# Patient Record
Sex: MALE | NOT HISPANIC OR LATINO
[De-identification: names, ages, dates, MRNs, and addresses within clinical notes are randomized per-mention and may not be internally consistent; named-entity substitution may affect disease eponyms.]

---

## 2018-12-10 ENCOUNTER — TRANSCRIPTION ENCOUNTER (OUTPATIENT)
Age: 33
End: 2018-12-10

## 2022-02-17 ENCOUNTER — TRANSCRIPTION ENCOUNTER (OUTPATIENT)
Age: 37
End: 2022-02-17

## 2023-01-21 ENCOUNTER — INPATIENT (INPATIENT)
Facility: HOSPITAL | Age: 38
LOS: 1 days | Discharge: HOME | DRG: 552 | End: 2023-01-23
Attending: SURGERY | Admitting: SURGERY
Payer: COMMERCIAL

## 2023-01-21 VITALS
HEART RATE: 85 BPM | OXYGEN SATURATION: 100 % | RESPIRATION RATE: 18 BRPM | SYSTOLIC BLOOD PRESSURE: 164 MMHG | TEMPERATURE: 98 F | DIASTOLIC BLOOD PRESSURE: 99 MMHG

## 2023-01-21 LAB
APTT BLD: 28.5 SEC — SIGNIFICANT CHANGE UP (ref 27.5–35.5)
BASOPHILS # BLD AUTO: 0.06 K/UL — SIGNIFICANT CHANGE UP (ref 0–0.2)
BASOPHILS NFR BLD AUTO: 0.8 % — SIGNIFICANT CHANGE UP (ref 0–2)
EOSINOPHIL # BLD AUTO: 0.2 K/UL — SIGNIFICANT CHANGE UP (ref 0–0.5)
EOSINOPHIL NFR BLD AUTO: 2.5 % — SIGNIFICANT CHANGE UP (ref 0–6)
ETHANOL SERPL-MCNC: 209 MG/DL — HIGH (ref 0–10)
HCT VFR BLD CALC: 41.9 % — SIGNIFICANT CHANGE UP (ref 39–50)
HGB BLD-MCNC: 13.2 G/DL — SIGNIFICANT CHANGE UP (ref 13–17)
IMM GRANULOCYTES NFR BLD AUTO: 0.8 % — SIGNIFICANT CHANGE UP (ref 0–0.9)
INR BLD: 1.11 RATIO — SIGNIFICANT CHANGE UP (ref 0.88–1.16)
LACTATE SERPL-SCNC: 4.1 MMOL/L — CRITICAL HIGH (ref 0.5–2)
LYMPHOCYTES # BLD AUTO: 2.82 K/UL — SIGNIFICANT CHANGE UP (ref 1–3.3)
LYMPHOCYTES # BLD AUTO: 35.5 % — SIGNIFICANT CHANGE UP (ref 13–44)
MCHC RBC-ENTMCNC: 28.4 PG — SIGNIFICANT CHANGE UP (ref 27–34)
MCHC RBC-ENTMCNC: 31.5 GM/DL — LOW (ref 32–36)
MCV RBC AUTO: 90.3 FL — SIGNIFICANT CHANGE UP (ref 80–100)
MONOCYTES # BLD AUTO: 0.55 K/UL — SIGNIFICANT CHANGE UP (ref 0–0.9)
MONOCYTES NFR BLD AUTO: 6.9 % — SIGNIFICANT CHANGE UP (ref 2–14)
NEUTROPHILS # BLD AUTO: 4.26 K/UL — SIGNIFICANT CHANGE UP (ref 1.8–7.4)
NEUTROPHILS NFR BLD AUTO: 53.5 % — SIGNIFICANT CHANGE UP (ref 43–77)
NRBC # BLD: 0 /100 WBCS — SIGNIFICANT CHANGE UP (ref 0–0)
PLATELET # BLD AUTO: 270 K/UL — SIGNIFICANT CHANGE UP (ref 150–400)
PROTHROM AB SERPL-ACNC: 12.9 SEC — SIGNIFICANT CHANGE UP (ref 10.5–13.4)
RBC # BLD: 4.64 M/UL — SIGNIFICANT CHANGE UP (ref 4.2–5.8)
RBC # FLD: 12.3 % — SIGNIFICANT CHANGE UP (ref 10.3–14.5)
WBC # BLD: 7.95 K/UL — SIGNIFICANT CHANGE UP (ref 3.8–10.5)
WBC # FLD AUTO: 7.95 K/UL — SIGNIFICANT CHANGE UP (ref 3.8–10.5)

## 2023-01-21 PROCEDURE — 99285 EMERGENCY DEPT VISIT HI MDM: CPT | Mod: 25

## 2023-01-21 PROCEDURE — 72170 X-RAY EXAM OF PELVIS: CPT | Mod: 26

## 2023-01-21 PROCEDURE — 71045 X-RAY EXAM CHEST 1 VIEW: CPT | Mod: 26

## 2023-01-21 PROCEDURE — 12013 RPR F/E/E/N/L/M 2.6-5.0 CM: CPT

## 2023-01-21 RX ORDER — SODIUM CHLORIDE 9 MG/ML
250 INJECTION INTRAMUSCULAR; INTRAVENOUS; SUBCUTANEOUS ONCE
Refills: 0 | Status: COMPLETED | OUTPATIENT
Start: 2023-01-21 | End: 2023-01-21

## 2023-01-21 RX ORDER — SODIUM CHLORIDE 9 MG/ML
1000 INJECTION, SOLUTION INTRAVENOUS ONCE
Refills: 0 | Status: COMPLETED | OUTPATIENT
Start: 2023-01-21 | End: 2023-01-21

## 2023-01-21 RX ADMIN — SODIUM CHLORIDE 250 MILLILITER(S): 9 INJECTION INTRAMUSCULAR; INTRAVENOUS; SUBCUTANEOUS at 22:55

## 2023-01-21 RX ADMIN — SODIUM CHLORIDE 1000 MILLILITER(S): 9 INJECTION, SOLUTION INTRAVENOUS at 23:51
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
## 2023-01-21 NOTE — ED PROVIDER NOTE - PHYSICAL EXAMINATION
Primary Survey  A - airway intact  B - bilateral breath sounds and good chest rise  C - initially BP: 164/99, palpable pulses in all extremities  D - GCS 15 on arrival  Exposure obtained    Secondary survey  Gen: NAD  HEENT: 2 cm laceration of nose, C-spine no ttp, c-collar in place, pupils 3mm equal & reactive  CV: pulse regularly present  Pulm: CTA B/L  Chest: intact without ttp  Abd: Soft, ND, NT, no rebound, no guarding  Groin: Normal appearing  Ext: Palp radial b/l, palp DP b/l  Back: no TTP, no palpable runoff, stepoff, or deformity
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
## 2023-01-21 NOTE — ED ADULT NURSE REASSESSMENT NOTE - NS ED NURSE REASSESS COMMENT FT1
Pt states "I want to leave what are we waiting on". Plan of care was explained to pt, awaiting CT. Pt removed C-collar and states "I do not need this I just need a massage". The risks were explained to the pt about removal of cervical collar. Pt understanding and does not want to wear c-collar.

## 2023-01-21 NOTE — ED PROVIDER NOTE - INTERPRETATION
EKG reviewed for rate, rhythm, axis, intervals and segments, including QRS morphology, P wave appearance T wave appearance, OK interval, and QT interval.  I find the EKG to be unremarkable in all of these regards except as follows: 1st deg AVB
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
## 2023-01-22 DIAGNOSIS — S12.9XXA FRACTURE OF NECK, UNSPECIFIED, INITIAL ENCOUNTER: ICD-10-CM

## 2023-01-22 LAB
ALBUMIN SERPL ELPH-MCNC: 4.7 G/DL — SIGNIFICANT CHANGE UP (ref 3.3–5)
ALP SERPL-CCNC: 58 U/L — SIGNIFICANT CHANGE UP (ref 40–120)
ALT FLD-CCNC: 20 U/L — SIGNIFICANT CHANGE UP (ref 10–45)
ANION GAP SERPL CALC-SCNC: 25 MMOL/L — HIGH (ref 5–17)
AST SERPL-CCNC: 52 U/L — HIGH (ref 10–40)
BILIRUB SERPL-MCNC: 1.1 MG/DL — SIGNIFICANT CHANGE UP (ref 0.2–1.2)
BUN SERPL-MCNC: 14 MG/DL — SIGNIFICANT CHANGE UP (ref 7–23)
CALCIUM SERPL-MCNC: 9.6 MG/DL — SIGNIFICANT CHANGE UP (ref 8.4–10.5)
CHLORIDE SERPL-SCNC: 100 MMOL/L — SIGNIFICANT CHANGE UP (ref 96–108)
CO2 SERPL-SCNC: 14 MMOL/L — LOW (ref 22–31)
CREAT SERPL-MCNC: 1.13 MG/DL — SIGNIFICANT CHANGE UP (ref 0.5–1.3)
EGFR: 85 ML/MIN/1.73M2 — SIGNIFICANT CHANGE UP
GLUCOSE SERPL-MCNC: 116 MG/DL — HIGH (ref 70–99)
LACTATE SERPL-SCNC: 3.4 MMOL/L — HIGH (ref 0.5–2)
LIDOCAIN IGE QN: 18 U/L — SIGNIFICANT CHANGE UP (ref 7–60)
POTASSIUM SERPL-MCNC: 4.1 MMOL/L — SIGNIFICANT CHANGE UP (ref 3.5–5.3)
POTASSIUM SERPL-SCNC: 4.1 MMOL/L — SIGNIFICANT CHANGE UP (ref 3.5–5.3)
PROT SERPL-MCNC: 8.1 G/DL — SIGNIFICANT CHANGE UP (ref 6–8.3)
SARS-COV-2 RNA SPEC QL NAA+PROBE: SIGNIFICANT CHANGE UP
SODIUM SERPL-SCNC: 139 MMOL/L — SIGNIFICANT CHANGE UP (ref 135–145)

## 2023-01-22 PROCEDURE — 99222 1ST HOSP IP/OBS MODERATE 55: CPT

## 2023-01-22 PROCEDURE — 70496 CT ANGIOGRAPHY HEAD: CPT | Mod: 26,MA

## 2023-01-22 PROCEDURE — G1012: CPT

## 2023-01-22 PROCEDURE — 70498 CT ANGIOGRAPHY NECK: CPT | Mod: 26,MA

## 2023-01-22 PROCEDURE — 72141 MRI NECK SPINE W/O DYE: CPT | Mod: 26

## 2023-01-22 PROCEDURE — 72125 CT NECK SPINE W/O DYE: CPT | Mod: 26,MA

## 2023-01-22 PROCEDURE — 99253 IP/OBS CNSLTJ NEW/EST LOW 45: CPT

## 2023-01-22 PROCEDURE — 72128 CT CHEST SPINE W/O DYE: CPT | Mod: 26,ME

## 2023-01-22 PROCEDURE — 70450 CT HEAD/BRAIN W/O DYE: CPT | Mod: 26,59,MA

## 2023-01-22 PROCEDURE — 72131 CT LUMBAR SPINE W/O DYE: CPT | Mod: 26,ME

## 2023-01-22 PROCEDURE — 70486 CT MAXILLOFACIAL W/O DYE: CPT | Mod: 26,MA

## 2023-01-22 RX ORDER — INFLUENZA VIRUS VACCINE 15; 15; 15; 15 UG/.5ML; UG/.5ML; UG/.5ML; UG/.5ML
0.5 SUSPENSION INTRAMUSCULAR ONCE
Refills: 0 | Status: COMPLETED | OUTPATIENT
Start: 2023-01-22 | End: 2023-01-22

## 2023-01-22 RX ORDER — ACETAMINOPHEN 500 MG
975 TABLET ORAL EVERY 6 HOURS
Refills: 0 | Status: DISCONTINUED | OUTPATIENT
Start: 2023-01-22 | End: 2023-01-23

## 2023-01-22 RX ORDER — OXYCODONE HYDROCHLORIDE 5 MG/1
5 TABLET ORAL EVERY 4 HOURS
Refills: 0 | Status: DISCONTINUED | OUTPATIENT
Start: 2023-01-22 | End: 2023-01-23

## 2023-01-22 RX ORDER — SODIUM CHLORIDE 9 MG/ML
500 INJECTION, SOLUTION INTRAVENOUS ONCE
Refills: 0 | Status: COMPLETED | OUTPATIENT
Start: 2023-01-22 | End: 2023-01-22

## 2023-01-22 RX ORDER — ACETAMINOPHEN 500 MG
975 TABLET ORAL ONCE
Refills: 0 | Status: COMPLETED | OUTPATIENT
Start: 2023-01-22 | End: 2023-01-22

## 2023-01-22 RX ORDER — CYCLOBENZAPRINE HYDROCHLORIDE 10 MG/1
10 TABLET, FILM COATED ORAL THREE TIMES A DAY
Refills: 0 | Status: DISCONTINUED | OUTPATIENT
Start: 2023-01-22 | End: 2023-01-23

## 2023-01-22 RX ORDER — BACITRACIN ZINC 500 UNIT/G
1 OINTMENT IN PACKET (EA) TOPICAL DAILY
Refills: 0 | Status: DISCONTINUED | OUTPATIENT
Start: 2023-01-22 | End: 2023-01-23

## 2023-01-22 RX ORDER — ENOXAPARIN SODIUM 100 MG/ML
40 INJECTION SUBCUTANEOUS EVERY 24 HOURS
Refills: 0 | Status: DISCONTINUED | OUTPATIENT
Start: 2023-01-22 | End: 2023-01-23

## 2023-01-22 RX ORDER — IBUPROFEN 200 MG
400 TABLET ORAL EVERY 6 HOURS
Refills: 0 | Status: DISCONTINUED | OUTPATIENT
Start: 2023-01-22 | End: 2023-01-23

## 2023-01-22 RX ORDER — ASPIRIN/CALCIUM CARB/MAGNESIUM 324 MG
81 TABLET ORAL DAILY
Refills: 0 | Status: DISCONTINUED | OUTPATIENT
Start: 2023-01-22 | End: 2023-01-23

## 2023-01-22 RX ORDER — OXYCODONE HYDROCHLORIDE 5 MG/1
10 TABLET ORAL EVERY 4 HOURS
Refills: 0 | Status: DISCONTINUED | OUTPATIENT
Start: 2023-01-22 | End: 2023-01-23

## 2023-01-22 RX ADMIN — Medication 975 MILLIGRAM(S): at 19:00

## 2023-01-22 RX ADMIN — OXYCODONE HYDROCHLORIDE 10 MILLIGRAM(S): 5 TABLET ORAL at 13:00

## 2023-01-22 RX ADMIN — Medication 975 MILLIGRAM(S): at 18:14

## 2023-01-22 RX ADMIN — Medication 975 MILLIGRAM(S): at 04:16

## 2023-01-22 RX ADMIN — Medication 400 MILLIGRAM(S): at 18:13

## 2023-01-22 RX ADMIN — CYCLOBENZAPRINE HYDROCHLORIDE 10 MILLIGRAM(S): 10 TABLET, FILM COATED ORAL at 21:34

## 2023-01-22 RX ADMIN — Medication 975 MILLIGRAM(S): at 13:01

## 2023-01-22 RX ADMIN — Medication 400 MILLIGRAM(S): at 19:00

## 2023-01-22 RX ADMIN — OXYCODONE HYDROCHLORIDE 10 MILLIGRAM(S): 5 TABLET ORAL at 14:00

## 2023-01-22 RX ADMIN — Medication 975 MILLIGRAM(S): at 14:00

## 2023-01-22 RX ADMIN — SODIUM CHLORIDE 500 MILLILITER(S): 9 INJECTION, SOLUTION INTRAVENOUS at 07:17

## 2023-01-22 RX ADMIN — Medication 81 MILLIGRAM(S): at 06:11

## 2023-01-22 RX ADMIN — CYCLOBENZAPRINE HYDROCHLORIDE 10 MILLIGRAM(S): 10 TABLET, FILM COATED ORAL at 16:02

## 2023-01-22 RX ADMIN — ENOXAPARIN SODIUM 40 MILLIGRAM(S): 100 INJECTION SUBCUTANEOUS at 18:18

## 2023-01-22 RX ADMIN — Medication 975 MILLIGRAM(S): at 06:25

## 2023-01-22 NOTE — ED PROCEDURE NOTE - PROCEDURE ADDITIONAL DETAILS
torn and missing tissue on left lateral nare. skin adhesive placed over wound with approximate. right lateral nare sutures places as above but again, missing and devitalized tissue.
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
## 2023-01-22 NOTE — H&P ADULT - NSHPPHYSICALEXAM_GEN_ALL_CORE
Secondary Survey:   General: NAD  HEENT: right sided facial abrasions and lacerations, nasal lac repaired by suture  Neck: Soft, midline trachea. mid c spine tenderness  Chest: No chest wall tenderness. or subq  emphysema   Cardiac: S1, S2, RRR  Respiratory: Bilateral breath sounds, clear and equal bilaterally  Abdomen: Soft, non-distended, non-tender, no rebound,   Groin: Normal appearing, pelvis stable   Ext: palp radial b/l UE, b/l DP palp in Lower Extrem.   Back: no TTP, no palpable runoff/stepoff/deformity

## 2023-01-22 NOTE — ED PROCEDURE NOTE - ATTENDING CONTRIBUTION TO CARE
Attending (Ac Caceres D.O.): I was present during the key portions of the procedure.
Attending (Ac Caceres D.O.): I was present during the key portions of the procedure.

## 2023-01-22 NOTE — ED ADULT NURSE REASSESSMENT NOTE - NS ED NURSE REASSESS COMMENT FT1
Report received from HOLDEN Mccray. Patient resting comfortably in bed at this time. Denies pain. On bedside cardiac monitor. Pending further imaging, to be admitted. VS as documented. bed locked and lowered. Comfort and safety measures maintained.

## 2023-01-22 NOTE — PATIENT PROFILE ADULT - FALL HARM RISK - HARM RISK INTERVENTIONS

## 2023-01-22 NOTE — CONSULT NOTE ADULT - TIME BILLING
Please note that over 55 minutes of time was spent in care of this patient including  - previsit preparation  - in person visit   - post visit documentation  - review of imaging  - discussion with colleagues

## 2023-01-22 NOTE — ED ADULT NURSE REASSESSMENT NOTE - NS ED NURSE REASSESS COMMENT FT1
patient sleeping, awaiting CT results with c-collar in place. VSS. Family at bedside, no complaints at this time.
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
## 2023-01-22 NOTE — CHART NOTE - NSCHARTNOTEFT_GEN_A_CORE
MRI Csp reviewed with attending orthopaedic spine surgeon Dr. Partida. Disruption of the ligamentum flavum at C5-6 could possible represent an unstable injury. This can be managed with a cervical fusion or prolonged cervical collar wear with close clinical follow up. Final plan pending further discussion with the patient.     < from: MR Cervical Spine No Cont (01.22.23 @ 10:50) >     Right-sided C6 and C7 facet fractures without displacement.   Interspinous ligament edema and dehiscence of the ligamentum flavum at   the C5-6 level. No cord compression or intraspinal hemorrhage.    < end of copied text >      For all questions related to patient care, please reach out to the on-call team via the pager.     Yvette Pearson PGY-3  Orthopaedic Surgery  [facility] [phone]  [facility] [phone]  [facility] [phone] MRI CSp reviewed with attending orthopaedic spine surgeon Dr. Partida. Disruption of the ligamentum flavum at C5-6 could possibly represent an unstable injury. This can be managed with a cervical fusion or prolonged cervical collar wear with close clinical follow up. Final plan pending further discussion with the patient.     < from: MR Cervical Spine No Cont (01.22.23 @ 10:50) >     Right-sided C6 and C7 facet fractures without displacement.   Interspinous ligament edema and dehiscence of the ligamentum flavum at   the C5-6 level. No cord compression or intraspinal hemorrhage.    < end of copied text >      For all questions related to patient care, please reach out to the on-call team via the pager.     Yvette Pearson PGY-3  Orthopaedic Surgery  [facility] [phone]  [facility] [phone]  [facility] [phone]
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
## 2023-01-23 ENCOUNTER — TRANSCRIPTION ENCOUNTER (OUTPATIENT)
Age: 38
End: 2023-01-23

## 2023-01-23 VITALS
SYSTOLIC BLOOD PRESSURE: 125 MMHG | DIASTOLIC BLOOD PRESSURE: 78 MMHG | TEMPERATURE: 98 F | RESPIRATION RATE: 18 BRPM | HEART RATE: 64 BPM | OXYGEN SATURATION: 99 %

## 2023-01-23 DIAGNOSIS — S12.500A UNSPECIFIED DISPLACED FRACTURE OF SIXTH CERVICAL VERTEBRA, INITIAL ENCOUNTER FOR CLOSED FRACTURE: ICD-10-CM

## 2023-01-23 LAB
ANION GAP SERPL CALC-SCNC: 10 MMOL/L — SIGNIFICANT CHANGE UP (ref 5–17)
APTT BLD: 32.8 SEC — SIGNIFICANT CHANGE UP (ref 27.5–35.5)
BUN SERPL-MCNC: 14 MG/DL — SIGNIFICANT CHANGE UP (ref 7–23)
CALCIUM SERPL-MCNC: 10 MG/DL — SIGNIFICANT CHANGE UP (ref 8.4–10.5)
CHLORIDE SERPL-SCNC: 98 MMOL/L — SIGNIFICANT CHANGE UP (ref 96–108)
CO2 SERPL-SCNC: 28 MMOL/L — SIGNIFICANT CHANGE UP (ref 22–31)
CREAT SERPL-MCNC: 1.09 MG/DL — SIGNIFICANT CHANGE UP (ref 0.5–1.3)
EGFR: 89 ML/MIN/1.73M2 — SIGNIFICANT CHANGE UP
GLUCOSE SERPL-MCNC: 81 MG/DL — SIGNIFICANT CHANGE UP (ref 70–99)
HCT VFR BLD CALC: 39.9 % — SIGNIFICANT CHANGE UP (ref 39–50)
HGB BLD-MCNC: 12.6 G/DL — LOW (ref 13–17)
LACTATE SERPL-SCNC: 0.8 MMOL/L — SIGNIFICANT CHANGE UP (ref 0.5–2)
MAGNESIUM SERPL-MCNC: 1.9 MG/DL — SIGNIFICANT CHANGE UP (ref 1.6–2.6)
MCHC RBC-ENTMCNC: 28.7 PG — SIGNIFICANT CHANGE UP (ref 27–34)
MCHC RBC-ENTMCNC: 31.6 GM/DL — LOW (ref 32–36)
MCV RBC AUTO: 90.9 FL — SIGNIFICANT CHANGE UP (ref 80–100)
NRBC # BLD: 0 /100 WBCS — SIGNIFICANT CHANGE UP (ref 0–0)
PHOSPHATE SERPL-MCNC: 2.5 MG/DL — SIGNIFICANT CHANGE UP (ref 2.5–4.5)
PLATELET # BLD AUTO: 224 K/UL — SIGNIFICANT CHANGE UP (ref 150–400)
POTASSIUM SERPL-MCNC: 3.9 MMOL/L — SIGNIFICANT CHANGE UP (ref 3.5–5.3)
POTASSIUM SERPL-SCNC: 3.9 MMOL/L — SIGNIFICANT CHANGE UP (ref 3.5–5.3)
RBC # BLD: 4.39 M/UL — SIGNIFICANT CHANGE UP (ref 4.2–5.8)
RBC # FLD: 12.3 % — SIGNIFICANT CHANGE UP (ref 10.3–14.5)
SODIUM SERPL-SCNC: 136 MMOL/L — SIGNIFICANT CHANGE UP (ref 135–145)
WBC # BLD: 4.99 K/UL — SIGNIFICANT CHANGE UP (ref 3.8–10.5)
WBC # FLD AUTO: 4.99 K/UL — SIGNIFICANT CHANGE UP (ref 3.8–10.5)

## 2023-01-23 PROCEDURE — 99232 SBSQ HOSP IP/OBS MODERATE 35: CPT

## 2023-01-23 PROCEDURE — 99233 SBSQ HOSP IP/OBS HIGH 50: CPT | Mod: 1L

## 2023-01-23 PROCEDURE — 99231 SBSQ HOSP IP/OBS SF/LOW 25: CPT

## 2023-01-23 RX ORDER — IBUPROFEN 200 MG
1 TABLET ORAL
Qty: 0 | Refills: 0 | DISCHARGE
Start: 2023-01-23

## 2023-01-23 RX ORDER — ASPIRIN/CALCIUM CARB/MAGNESIUM 324 MG
1 TABLET ORAL
Qty: 0 | Refills: 0 | DISCHARGE
Start: 2023-01-23

## 2023-01-23 RX ORDER — ASPIRIN/CALCIUM CARB/MAGNESIUM 324 MG
1 TABLET ORAL
Qty: 90 | Refills: 0
Start: 2023-01-23 | End: 2023-04-22

## 2023-01-23 RX ORDER — ACETAMINOPHEN 500 MG
3 TABLET ORAL
Qty: 0 | Refills: 0 | DISCHARGE
Start: 2023-01-23

## 2023-01-23 RX ORDER — OXYCODONE HYDROCHLORIDE 5 MG/1
1 TABLET ORAL
Qty: 12 | Refills: 0
Start: 2023-01-23 | End: 2023-01-25

## 2023-01-23 RX ADMIN — OXYCODONE HYDROCHLORIDE 10 MILLIGRAM(S): 5 TABLET ORAL at 10:46

## 2023-01-23 RX ADMIN — Medication 975 MILLIGRAM(S): at 13:04

## 2023-01-23 RX ADMIN — Medication 400 MILLIGRAM(S): at 05:48

## 2023-01-23 RX ADMIN — Medication 400 MILLIGRAM(S): at 06:18

## 2023-01-23 RX ADMIN — Medication 975 MILLIGRAM(S): at 13:34

## 2023-01-23 RX ADMIN — Medication 975 MILLIGRAM(S): at 05:48

## 2023-01-23 RX ADMIN — CYCLOBENZAPRINE HYDROCHLORIDE 10 MILLIGRAM(S): 10 TABLET, FILM COATED ORAL at 14:06

## 2023-01-23 RX ADMIN — OXYCODONE HYDROCHLORIDE 10 MILLIGRAM(S): 5 TABLET ORAL at 15:31

## 2023-01-23 RX ADMIN — OXYCODONE HYDROCHLORIDE 10 MILLIGRAM(S): 5 TABLET ORAL at 16:01

## 2023-01-23 RX ADMIN — CYCLOBENZAPRINE HYDROCHLORIDE 10 MILLIGRAM(S): 10 TABLET, FILM COATED ORAL at 05:48

## 2023-01-23 RX ADMIN — Medication 975 MILLIGRAM(S): at 06:18

## 2023-01-23 RX ADMIN — Medication 400 MILLIGRAM(S): at 12:04

## 2023-01-23 RX ADMIN — OXYCODONE HYDROCHLORIDE 10 MILLIGRAM(S): 5 TABLET ORAL at 11:16

## 2023-01-23 RX ADMIN — Medication 81 MILLIGRAM(S): at 12:04

## 2023-01-23 RX ADMIN — Medication 1 APPLICATION(S): at 12:04

## 2023-01-23 RX ADMIN — Medication 400 MILLIGRAM(S): at 12:34
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
## 2023-01-23 NOTE — DISCHARGE NOTE NURSING/CASE MANAGEMENT/SOCIAL WORK - NSDCPEFALRISK_GEN_ALL_CORE
For information on Fall & Injury Prevention, visit: [email or web address] OR  [email or web address] OR  https://www.cdc.gov/steadi/patient.html

## 2023-01-23 NOTE — DISCHARGE NOTE PROVIDER - PROVIDER TOKENS
PROVIDER:[TOKEN:[[id number]],FOLLOWUP:[2 weeks]] PROVIDER:[TOKEN:[[id number]],FOLLOWUP:[2 weeks]],PROVIDER:[TOKEN:[[id number]],FOLLOWUP:[Routine]] PROVIDER:[TOKEN:[[id number]],FOLLOWUP:[2 weeks]],PROVIDER:[TOKEN:[[id number]],FOLLOWUP:[Routine]],PROVIDER:[TOKEN:[[id number]],FOLLOWUP:[1 week]]

## 2023-01-23 NOTE — PHYSICAL THERAPY INITIAL EVALUATION ADULT - ADDITIONAL COMMENTS
Pt lives in apartment with 2 steps to enter; independent prior to admission and works in refrigerator repairs.

## 2023-01-23 NOTE — DISCHARGE NOTE PROVIDER - CARE PROVIDER_API CALL
Kaylee Harrison (MD)  Surgery  [address], [address] [place] [address]  Phone: [phone]  Fax: [phone]  Follow Up Time: 2 weeks   Kaylee Harrison (MD)  Surgery  [address] [facility] [address] [facility], [address] [place] [address]  Phone: [phone]  Fax: [phone]  Follow Up Time: 2 weeks    Hugo Gordillo (DDS; MD)  OralMaxillofacial Surgery  [address] [place] [address]  Phone: [phone]  Fax: [phone]  Follow Up Time: Routine   Kaylee Harrison)  Surgery  [address] [facility], [address] [place] [address]  Phone: [phone]  Fax: [phone]  Follow Up Time: 2 weeks    Hugo Gordillo (DDS; MD)  OralMaxillofacial Surgery  [address] [place] [address]  Phone: [phone]  Fax: [phone]  Follow Up Time: Routine    Reza Partida)  [facility]  [address] [facility], [address]  [place] [address]  Phone: [phone]  Fax: [phone]  Follow Up Time: 1 week

## 2023-01-23 NOTE — DISCHARGE NOTE PROVIDER - DISCHARGE SERVICE FOR PATIENT
Attempted to call X 2. Busy     on the discharge service for the patient. I have reviewed and made amendments to the documentation where necessary.

## 2023-01-23 NOTE — DISCHARGE NOTE PROVIDER - CARE PROVIDERS DIRECT ADDRESSES
,[email or web address],DirectAddress_Unknown ,[email or web address],DirectAddress_Unknown,DirectAddress_Unknown
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
## 2023-01-23 NOTE — DISCHARGE NOTE NURSING/CASE MANAGEMENT/SOCIAL WORK - PATIENT PORTAL LINK FT
You can access the FollowMyHealth Patient Portal offered by [facility] by registering at the following website: [email or web address]. By joining [facility]’s FollowMyHealth portal, you will also be able to view your health information using other applications (apps) compatible with our system.
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
## 2023-01-23 NOTE — DISCHARGE NOTE PROVIDER - NSDCMRMEDTOKEN_GEN_ALL_CORE_FT
acetaminophen 325 mg oral tablet: 3 tab(s) orally every 6 hours  aspirin 81 mg oral tablet, chewable: 1 tab(s) orally once a day  ibuprofen 400 mg oral tablet: 1 tab(s) orally every 6 hours   acetaminophen 325 mg oral tablet: 3 tab(s) orally every 6 hours  aspirin 81 mg oral tablet, chewable: 1 tab(s) orally once a day  ibuprofen 400 mg oral tablet: 1 tab(s) orally every 6 hours  oxyCODONE 5 mg oral tablet: 1 tab(s) orally every 6 hours, As Needed for severe pain MDD:4   acetaminophen 325 mg oral tablet: 3 tab(s) orally every 6 hours  aspirin 81 mg oral delayed release tablet: 1 tab(s) orally once a day   ibuprofen 400 mg oral tablet: 1 tab(s) orally every 6 hours  oxyCODONE 5 mg oral tablet: 1 tab(s) orally every 6 hours, As Needed for severe pain MDD:4
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
## 2023-01-23 NOTE — PROGRESS NOTE ADULT - TIME BILLING
Please note that over 55 minutes of time was spent in care of this patient including  - previsit preparation  - in person visit  - post visit documentaiton  - discussion with colleagues  - review of imaging  - discussion of surgical options

## 2023-01-25 PROBLEM — Z00.00 ENCOUNTER FOR PREVENTIVE HEALTH EXAMINATION: Status: ACTIVE | Noted: 2023-01-25

## 2023-01-30 ENCOUNTER — APPOINTMENT (OUTPATIENT)
Dept: ORTHOPEDIC SURGERY | Facility: CLINIC | Age: 38
End: 2023-01-30
Payer: COMMERCIAL

## 2023-01-30 VITALS
TEMPERATURE: 97.3 F | HEIGHT: 75 IN | WEIGHT: 185 LBS | SYSTOLIC BLOOD PRESSURE: 144 MMHG | HEART RATE: 81 BPM | BODY MASS INDEX: 23 KG/M2 | DIASTOLIC BLOOD PRESSURE: 96 MMHG

## 2023-01-30 PROCEDURE — 99214 OFFICE O/P EST MOD 30 MIN: CPT

## 2023-01-30 PROCEDURE — 72040 X-RAY EXAM NECK SPINE 2-3 VW: CPT

## 2023-01-30 PROCEDURE — 99072 ADDL SUPL MATRL&STAF TM PHE: CPT
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
## 2023-01-30 NOTE — PHYSICAL EXAM
[de-identified] : Cervical Physical Exam\par \par Gait - Normal\par \par Station - Normal\par \par Sagittal balance - Normal \par \par Compensatory mechanism? - None\par \par Horizontal gaze - Maintained\par \par Heel walk - Normal\par \par Toe walk - Normal\par \par Reflexes\par Biceps - Normal\par Triceps - Normal\par Brachioradialis - Normal\par Patellar - Normal\par Gastroc - Normal\par Clonus -No\par \par Pettit´s - None\par \par Shoulder Exam - Normal\par \par Spurling´s - None\par \par Wrist Pulses -2+ radial/ulnar\par \par Foot Pulses -2+ DP/PT\par \par Cervical range of motion -collar in place\par \par Sensation \par C5-T1 sensation intact to light touch bilaterally\par \par L1-S1 sensation intact to light touch bilaterally\par \par Motor\par \par \par 	Deltoid	Biceps	Triceps	WF	WE	IO	\par Right	5/5	5/5	5/5	5/5	5/5	5/5	5/5\par Left	5/5	5/5	5/5	5/5	5/5	5/5	5/5\par \par \par 	IP	Quad	HS	TA	Gastroc	EHL\par Right	5/5	5/5	5/5	5/5	5/5	5/5\par Left	5/5	5/5	5/5	5/5	5/5	5/5 [de-identified] : Cervical radiographs\par Overall cervical alignment within normal limits\par Normal alignment

## 2023-03-06 ENCOUNTER — APPOINTMENT (OUTPATIENT)
Dept: ORTHOPEDIC SURGERY | Facility: CLINIC | Age: 38
End: 2023-03-06
Payer: COMMERCIAL

## 2023-03-06 VITALS
OXYGEN SATURATION: 95 % | BODY MASS INDEX: 22.38 KG/M2 | HEIGHT: 75 IN | TEMPERATURE: 97.5 F | HEART RATE: 68 BPM | WEIGHT: 180 LBS | DIASTOLIC BLOOD PRESSURE: 83 MMHG | SYSTOLIC BLOOD PRESSURE: 147 MMHG

## 2023-03-06 PROBLEM — Z78.9 OTHER SPECIFIED HEALTH STATUS: Chronic | Status: ACTIVE | Noted: 2023-01-21

## 2023-03-06 PROCEDURE — 72050 X-RAY EXAM NECK SPINE 4/5VWS: CPT

## 2023-03-06 PROCEDURE — 99072 ADDL SUPL MATRL&STAF TM PHE: CPT

## 2023-03-06 PROCEDURE — 99214 OFFICE O/P EST MOD 30 MIN: CPT

## 2023-03-06 NOTE — ADDENDUM
[FreeTextEntry1] : I, Shona Mccray, acted solely as a scribe for Dr. Reza Partida MD on this date 03/06/2023  \par \par All medical record entries made by the Scribe were at my, Dr. Reza Partida MD., direction and personally dictated by me on 03/06/2023 . I have reviewed the chart and agree that the record accurately reflects my personal performance of the history, physical exam, assessment and plan. I have also personally directed, reviewed, and agreed with the chart.
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
## 2023-03-06 NOTE — PHYSICAL EXAM
[de-identified] : Cervical Physical Exam\par \par Gait - Normal\par \par Station - Normal\par \par Sagittal balance - Normal \par \par Compensatory mechanism? - None\par \par Horizontal gaze - Maintained\par \par Heel walk - Normal\par \par Toe walk - Normal\par \par Reflexes\par Biceps - Normal\par Triceps - Normal\par Brachioradialis - Normal\par Patellar - Normal\par Gastroc - Normal\par Clonus -No\par \par Pettit´s - None\par \par Shoulder Exam - Normal\par \par Spurling´s - None\par \par Wrist Pulses -2+ radial/ulnar\par \par Foot Pulses -2+ DP/PT\par \par Cervical range of motion -collar in place\par \par Sensation \par C5-T1 sensation intact to light touch bilaterally\par \par L1-S1 sensation intact to light touch bilaterally\par \par Motor\par \par \par 	Deltoid	Biceps	Triceps	WF	WE	IO	\par Right	5/5	5/5	5/5	5/5	5/5	5/5	5/5\par Left	5/5	5/5	5/5	5/5	5/5	5/5	5/5\par \par \par 	IP	Quad	HS	TA	Gastroc	EHL\par Right	5/5	5/5	5/5	5/5	5/5	5/5\par Left	5/5	5/5	5/5	5/5	5/5	5/5 [de-identified] : Cervical radiographs\par Overall cervical alignment within normal limits\par Normal alignment\par \par Cervical Radiographs Reviewed 03//06/2023 \par Unstable facet fracture\par Significant motion noted\par I do note motion in the subaxial spine corresponding to the level of facet fracture on flexion-extension radiographs

## 2023-03-13 ENCOUNTER — NON-APPOINTMENT (OUTPATIENT)
Age: 38
End: 2023-03-13

## 2023-03-13 ENCOUNTER — APPOINTMENT (OUTPATIENT)
Dept: ORTHOPEDIC SURGERY | Facility: CLINIC | Age: 38
End: 2023-03-13
Payer: COMMERCIAL

## 2023-03-13 VITALS — DIASTOLIC BLOOD PRESSURE: 87 MMHG | SYSTOLIC BLOOD PRESSURE: 123 MMHG | HEART RATE: 67 BPM

## 2023-03-13 DIAGNOSIS — S12.591A: ICD-10-CM

## 2023-03-13 DIAGNOSIS — M43.10 SPONDYLOLISTHESIS, SITE UNSPECIFIED: ICD-10-CM

## 2023-03-13 PROCEDURE — 99214 OFFICE O/P EST MOD 30 MIN: CPT

## 2023-03-13 PROCEDURE — 99072 ADDL SUPL MATRL&STAF TM PHE: CPT

## 2023-03-14 PROBLEM — S12.591A: Status: ACTIVE | Noted: 2023-03-14

## 2023-03-14 PROBLEM — M43.10 ACQUIRED SPONDYLOLISTHESIS: Status: ACTIVE | Noted: 2023-03-14

## 2023-04-03 ENCOUNTER — APPOINTMENT (OUTPATIENT)
Dept: ORTHOPEDIC SURGERY | Facility: CLINIC | Age: 38
End: 2023-04-03

## 2023-04-06 ENCOUNTER — APPOINTMENT (OUTPATIENT)
Dept: ORTHOPEDIC SURGERY | Facility: CLINIC | Age: 38
End: 2023-04-06
Payer: COMMERCIAL

## 2023-04-06 VITALS
SYSTOLIC BLOOD PRESSURE: 139 MMHG | HEART RATE: 77 BPM | OXYGEN SATURATION: 95 % | BODY MASS INDEX: 22.38 KG/M2 | HEIGHT: 75 IN | TEMPERATURE: 97.3 F | DIASTOLIC BLOOD PRESSURE: 92 MMHG | WEIGHT: 180 LBS

## 2023-04-06 PROCEDURE — 99214 OFFICE O/P EST MOD 30 MIN: CPT

## 2023-04-06 PROCEDURE — 72050 X-RAY EXAM NECK SPINE 4/5VWS: CPT

## 2023-04-06 NOTE — ADDENDUM
[FreeTextEntry1] : I, Shona Mccray, acted solely as a scribe for Dr. Reza Partida MD on this date 04/06/2023  \par \par All medical record entries made by the Scribe were at my, Dr. Reza Partida MD., direction and personally dictated by me on 04/06/2023 . I have reviewed the chart and agree that the record accurately reflects my personal performance of the history, physical exam, assessment and plan. I have also personally directed, reviewed, and agreed with the chart.
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
## 2023-04-06 NOTE — PHYSICAL EXAM
[de-identified] : Cervical Physical Exam\par \par Gait - Normal\par \par Station - Normal\par \par Sagittal balance - Normal \par \par Compensatory mechanism? - None\par \par Horizontal gaze - Maintained\par \par Heel walk - Normal\par \par Toe walk - Normal\par \par Reflexes\par Biceps - Normal\par Triceps - Normal\par Brachioradialis - Normal\par Patellar - Normal\par Gastroc - Normal\par Clonus -No\par \par Pettit´s - None\par \par Shoulder Exam - Normal\par \par Spurling´s - None\par \par Wrist Pulses -2+ radial/ulnar\par \par Foot Pulses -2+ DP/PT\par \par Cervical range of motion -collar in place\par \par Sensation \par C5-T1 sensation intact to light touch bilaterally\par \par L1-S1 sensation intact to light touch bilaterally\par \par Motor\par \par \par 	Deltoid	Biceps	Triceps	WF	WE	IO	\par Right	5/5	5/5	5/5	5/5	5/5	5/5	5/5\par Left	5/5	5/5	5/5	5/5	5/5	5/5	5/5\par \par \par 	IP	Quad	HS	TA	Gastroc	EHL\par Right	5/5	5/5	5/5	5/5	5/5	5/5\par Left	5/5	5/5	5/5	5/5	5/5	5/5 [de-identified] : Cervical radiographs\par Overall cervical alignment within normal limits\par Normal alignment\par \par Cervical Radiographs Reviewed 03//06/2023 \par Unstable facet fracture\par Significant motion noted\par I do note motion in the subaxial spine corresponding to the level of facet fracture on flexion-extension radiographs\par \par Cervical Radiographs reviewed 04/06/2023 \par unstable facet fracture at C6-C7 \par significant motion noted \par \par Cervical CT Scan Reviewed \par unstable facet fracture noted at C6-C7\par \par \par

## 2023-04-26 ENCOUNTER — APPOINTMENT (OUTPATIENT)
Dept: ORTHOPEDIC SURGERY | Facility: CLINIC | Age: 38
End: 2023-04-26
Payer: COMMERCIAL

## 2023-04-26 DIAGNOSIS — M54.2 CERVICALGIA: ICD-10-CM

## 2023-04-26 PROCEDURE — 99214 OFFICE O/P EST MOD 30 MIN: CPT

## 2023-04-26 PROCEDURE — 72050 X-RAY EXAM NECK SPINE 4/5VWS: CPT
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
## 2023-04-26 NOTE — ADDENDUM
[FreeTextEntry1] : I, Shona Mccray, acted solely as a scribe for Dr. Reza Partida MD on this date 04/26/2023  \par \par All medical record entries made by the Scribe were at my, Dr. Reza Partida MD., direction and personally dictated by me on 04/26/2023 . I have reviewed the chart and agree that the record accurately reflects my personal performance of the history, physical exam, assessment and plan. I have also personally directed, reviewed, and agreed with the chart.

## 2023-04-26 NOTE — PHYSICAL EXAM
[de-identified] : Cervical Physical Exam\par \par Gait - Normal\par \par Station - Normal\par \par Sagittal balance - Normal \par \par Compensatory mechanism? - None\par \par Horizontal gaze - Maintained\par \par Heel walk - Normal\par \par Toe walk - Normal\par \par Reflexes\par Biceps - Normal\par Triceps - Normal\par Brachioradialis - Normal\par Patellar - Normal\par Gastroc - Normal\par Clonus -No\par \par Pettit´s - None\par \par Shoulder Exam - Normal\par \par Spurling´s - None\par \par Wrist Pulses -2+ radial/ulnar\par \par Foot Pulses -2+ DP/PT\par \par Cervical range of motion -collar in place\par \par Sensation \par C5-T1 sensation intact to light touch bilaterally\par \par L1-S1 sensation intact to light touch bilaterally\par \par Motor\par \par \par 	Deltoid	Biceps	Triceps	WF	WE	IO	\par Right	5/5	5/5	5/5	5/5	5/5	5/5	5/5\par Left	5/5	5/5	5/5	5/5	5/5	5/5	5/5\par \par \par 	IP	Quad	HS	TA	Gastroc	EHL\par Right	5/5	5/5	5/5	5/5	5/5	5/5\par Left	5/5	5/5	5/5	5/5	5/5	5/5 [de-identified] : Cervical radiographs\par Overall cervical alignment within normal limits\par Normal alignment\par \par Cervical Radiographs Reviewed 03//06/2023 \par Unstable facet fracture\par Significant motion noted\par I do note motion in the subaxial spine corresponding to the level of facet fracture on flexion-extension radiographs\par \par Cervical Radiographs reviewed 04/06/2023 \par unstable facet fracture at C6-C7 \par significant motion noted \par \par Cervical CT Scan Reviewed \par unstable facet fracture noted at C6-C7\par \par Cervical Radiographs reviewed 04/26/2023 \par unstable facet fracture at C6-C7 \par significant motion noted \par
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
## 2023-06-28 ENCOUNTER — APPOINTMENT (OUTPATIENT)
Dept: ORTHOPEDIC SURGERY | Facility: CLINIC | Age: 38
End: 2023-06-28

## 2023-07-13 NOTE — REASON FOR VISIT
[Initial Visit] : an initial visit for [Neck Pain] : neck pain Partial Purse String (Simple) Text: Given the location of the defect and the characteristics of the surrounding skin a simple purse string closure was deemed most appropriate.  Undermining was performed circumfirentially around the surgical defect.  A purse string suture was then placed and tightened. Wound tension only allowed a partial closure of the circular defect.
